# Patient Record
Sex: FEMALE | Race: AMERICAN INDIAN OR ALASKA NATIVE
[De-identification: names, ages, dates, MRNs, and addresses within clinical notes are randomized per-mention and may not be internally consistent; named-entity substitution may affect disease eponyms.]

---

## 2020-10-15 ENCOUNTER — HOSPITAL ENCOUNTER (OUTPATIENT)
Dept: HOSPITAL 5 - ECHO | Age: 75
Discharge: HOME | End: 2020-10-15
Attending: INTERNAL MEDICINE
Payer: MEDICARE

## 2020-10-15 DIAGNOSIS — R55: Primary | ICD-10-CM

## 2020-10-15 DIAGNOSIS — I65.23: ICD-10-CM

## 2020-10-15 PROCEDURE — 93880 EXTRACRANIAL BILAT STUDY: CPT

## 2020-10-15 PROCEDURE — 93306 TTE W/DOPPLER COMPLETE: CPT

## 2020-10-15 NOTE — VASCULAR LAB REPORT
BILATERAL CAROTID DUPLEX DOPPLER ULTRASOUND



HISTORY: SYNCOPE AND COLLAPSE 



COMPARISON: None.



TECHNIQUE: Gray scale, color and spectral Doppler imaging was performed of the extracranial neck devang
eugenia.  All stenosis measurements were obtained in comparison with the distal internal carotid artery 
per the SRU consensus criteria.



FINDINGS:



RIGHT NECK ARTERIES: 

CCA: Mild homogenous plaque at the carotid bulb.

ICA: No significant abnormality.     

ECA: No significant abnormality.

Vertebral Artery: No significant abnormality.  Antegrade flow.



LEFT NECK ARTERIES:

CCA: Mild homogenous plaque at the carotid bulb.

ICA: No significant abnormality.

ECA: No significant abnormality.

Vertebral Artery: No significant abnormality.  Antegrade flow.



CCA PSV:       Right: 71           Left: 71     cm/sec           

ICA PSV:         Right:  67            Left: 71     cm/sec 

ICA/CCA:        Right: 0.9              Left 1.0



ADDITIONAL FINDINGS: None.



IMPRESSION:

1. No hemodynamically significant stenosis in either carotid artery. Estimated stenosis is 0-50%, chrissie
aterally.



Signer Name: Loy Iglesias MD 

Signed: 10/15/2020 1:08 PM

Workstation Name: [id number]